# Patient Record
(demographics unavailable — no encounter records)

---

## 2024-12-05 NOTE — ASSESSMENT
[FreeTextEntry1] : A complete skin examination was performed.  There is no evidence of skin cancer.  We discussed the importance of photoprotection, including the use of hats, protective clothing and sunscreens with an SPF of at least 30.  Sun avoidance was also discussed.  The ABCDE's of melanoma was discussed.  Regular skin exams recommended.  DF - benign.  Seb derm Education. H&S shampoo - not too effective. Recommend alternating Zinc shampoo's, selenium shampoo. Declines nizoral shampoo at this time.

## 2024-12-05 NOTE — HISTORY OF PRESENT ILLNESS
[FreeTextEntry1] : Patient presents for skin examination. [de-identified] : Denies new, changing, bleeding or tender lesions on the skin over the past year.

## 2024-12-05 NOTE — PHYSICAL EXAM
[Alert] : alert [Oriented x 3] : ~L oriented x 3 [Well Nourished] : well nourished [Full Body Skin Exam Performed] : performed [FreeTextEntry3] : A full skin exam was performed including the scalp, face (including lips, ears, nose and eyes), neck, chest, abdomen, back, buttocks, upper extremities and lower extremities.  The patient declined examination of the genitalia.   The exam revealed the following benign growths: Vigo pigmented nevi. Seborrheic keratoses. Cherry angioma. Seb hyperplasia - face. Hyperpigmented erythematous barely elevated papule with positive dimple sign, c/w a dermatofibroma on the left medial malleolus.  Trace scale, scalp.